# Patient Record
Sex: FEMALE | Race: WHITE | NOT HISPANIC OR LATINO | Employment: OTHER | URBAN - NONMETROPOLITAN AREA
[De-identification: names, ages, dates, MRNs, and addresses within clinical notes are randomized per-mention and may not be internally consistent; named-entity substitution may affect disease eponyms.]

---

## 2023-10-10 PROBLEM — M75.02 ADHESIVE CAPSULITIS OF LEFT SHOULDER: Status: ACTIVE | Noted: 2023-10-10

## 2023-10-10 PROBLEM — N60.19 FIBROCYSTIC DISEASE OF BREAST: Status: ACTIVE | Noted: 2023-10-10

## 2023-10-10 PROBLEM — R09.89 DIMINISHED PULSES IN LOWER EXTREMITY: Status: ACTIVE | Noted: 2023-10-10

## 2023-10-10 PROBLEM — K74.60 LIVER CIRRHOSIS SECONDARY TO NASH (MULTI): Status: ACTIVE | Noted: 2023-10-10

## 2023-10-10 PROBLEM — E66.811 CLASS 1 OBESITY WITH BODY MASS INDEX (BMI) OF 32.0 TO 32.9 IN ADULT: Status: ACTIVE | Noted: 2023-10-10

## 2023-10-10 PROBLEM — E66.9 CLASS 1 OBESITY WITH BODY MASS INDEX (BMI) OF 32.0 TO 32.9 IN ADULT: Status: ACTIVE | Noted: 2023-10-10

## 2023-10-10 PROBLEM — R60.9 EDEMA: Status: ACTIVE | Noted: 2023-10-10

## 2023-10-10 PROBLEM — I25.10 ATHEROSCLEROSIS OF NATIVE CORONARY ARTERY OF NATIVE HEART WITHOUT ANGINA PECTORIS: Status: ACTIVE | Noted: 2023-10-10

## 2023-10-10 PROBLEM — Z98.61 HISTORY OF PTCA: Status: ACTIVE | Noted: 2023-10-10

## 2023-10-10 PROBLEM — R79.89 ABNORMAL LIVER FUNCTION TEST: Status: ACTIVE | Noted: 2023-10-10

## 2023-10-10 PROBLEM — N63.0 BREAST LUMP: Status: ACTIVE | Noted: 2023-10-10

## 2023-10-10 PROBLEM — C50.119 CANCER OF CENTRAL PORTION OF FEMALE BREAST (MULTI): Status: ACTIVE | Noted: 2023-10-10

## 2023-10-10 PROBLEM — K74.60 CHRONIC LIVER DISEASE AND CIRRHOSIS (MULTI): Status: ACTIVE | Noted: 2023-10-10

## 2023-10-10 PROBLEM — K75.81 LIVER CIRRHOSIS SECONDARY TO NASH (MULTI): Status: ACTIVE | Noted: 2023-10-10

## 2023-10-10 PROBLEM — K76.9 CHRONIC LIVER DISEASE AND CIRRHOSIS (MULTI): Status: ACTIVE | Noted: 2023-10-10

## 2023-10-10 PROBLEM — I65.29 CAROTID ARTERY STENOSIS: Status: ACTIVE | Noted: 2023-10-10

## 2023-10-10 PROBLEM — R10.11 RIGHT UPPER QUADRANT ABDOMINAL PAIN: Status: ACTIVE | Noted: 2023-10-10

## 2023-10-10 PROBLEM — E78.5 HYPERLIPIDEMIA: Status: ACTIVE | Noted: 2023-10-10

## 2023-10-10 PROBLEM — E11.9 TYPE 2 DIABETES MELLITUS (MULTI): Status: ACTIVE | Noted: 2023-10-10

## 2023-10-10 PROBLEM — I73.9 PERIPHERAL VASCULAR DISEASE (CMS-HCC): Status: ACTIVE | Noted: 2023-10-10

## 2023-10-10 PROBLEM — I10 ESSENTIAL HYPERTENSION: Status: ACTIVE | Noted: 2023-10-10

## 2023-10-10 RX ORDER — NITROGLYCERIN 0.4 MG/1
0.4 TABLET SUBLINGUAL EVERY 5 MIN PRN
COMMUNITY
Start: 2021-11-03

## 2023-10-10 RX ORDER — IRBESARTAN 300 MG/1
1 TABLET ORAL DAILY
COMMUNITY
Start: 2021-05-29 | End: 2024-03-04 | Stop reason: SDUPTHER

## 2023-10-10 RX ORDER — ATORVASTATIN CALCIUM 20 MG/1
1 TABLET, FILM COATED ORAL NIGHTLY
COMMUNITY
Start: 2018-01-26 | End: 2024-02-20 | Stop reason: SDUPTHER

## 2023-10-10 RX ORDER — ASPIRIN 81 MG/1
1 TABLET ORAL DAILY
COMMUNITY
Start: 2022-04-07

## 2023-10-10 RX ORDER — AMLODIPINE BESYLATE 10 MG/1
1 TABLET ORAL DAILY
COMMUNITY
End: 2024-03-04 | Stop reason: SDUPTHER

## 2023-10-10 RX ORDER — HYDROCHLOROTHIAZIDE 12.5 MG/1
1 TABLET ORAL DAILY
COMMUNITY
Start: 2018-02-27 | End: 2024-05-23 | Stop reason: SDUPTHER

## 2023-10-10 RX ORDER — OXYBUTYNIN CHLORIDE 10 MG/1
10 TABLET, EXTENDED RELEASE ORAL DAILY
COMMUNITY
Start: 2016-04-19

## 2023-10-10 RX ORDER — CLONIDINE HYDROCHLORIDE 0.1 MG/1
0.5 TABLET ORAL 2 TIMES DAILY
COMMUNITY
Start: 2017-03-05 | End: 2024-04-04 | Stop reason: SDUPTHER

## 2023-10-10 RX ORDER — SERTRALINE HYDROCHLORIDE 50 MG/1
1 TABLET, FILM COATED ORAL DAILY
COMMUNITY
Start: 2021-10-28 | End: 2023-10-18

## 2023-10-10 RX ORDER — OMEPRAZOLE 20 MG/1
1 CAPSULE, DELAYED RELEASE ORAL DAILY
COMMUNITY

## 2023-10-10 RX ORDER — METOPROLOL TARTRATE 25 MG/1
0.5 TABLET, FILM COATED ORAL 2 TIMES DAILY
COMMUNITY
Start: 2021-02-08 | End: 2023-10-23 | Stop reason: SDUPTHER

## 2023-10-10 RX ORDER — NITROGLYCERIN 40 MG/1
PATCH TRANSDERMAL
COMMUNITY
Start: 2017-11-27

## 2023-10-11 ENCOUNTER — APPOINTMENT (OUTPATIENT)
Dept: CARDIOLOGY | Facility: CLINIC | Age: 83
End: 2023-10-11
Payer: COMMERCIAL

## 2023-10-11 ENCOUNTER — TRANSCRIBE ORDERS (OUTPATIENT)
Dept: CARDIOLOGY | Facility: CLINIC | Age: 83
End: 2023-10-11

## 2023-10-11 DIAGNOSIS — R55 NEAR SYNCOPE: ICD-10-CM

## 2023-10-18 ENCOUNTER — ANCILLARY PROCEDURE (OUTPATIENT)
Dept: CARDIOLOGY | Facility: CLINIC | Age: 83
End: 2023-10-18
Payer: COMMERCIAL

## 2023-10-18 ENCOUNTER — CLINICAL SUPPORT (OUTPATIENT)
Dept: CARDIOLOGY | Facility: CLINIC | Age: 83
End: 2023-10-18
Payer: COMMERCIAL

## 2023-10-18 VITALS
BODY MASS INDEX: 33.23 KG/M2 | DIASTOLIC BLOOD PRESSURE: 40 MMHG | HEIGHT: 61 IN | WEIGHT: 176 LBS | SYSTOLIC BLOOD PRESSURE: 110 MMHG | HEART RATE: 72 BPM

## 2023-10-18 DIAGNOSIS — R55 NEAR SYNCOPE: ICD-10-CM

## 2023-10-18 PROCEDURE — 99211 OFF/OP EST MAY X REQ PHY/QHP: CPT | Performed by: INTERNAL MEDICINE

## 2023-10-18 PROCEDURE — 93224 XTRNL ECG REC UP TO 48 HRS: CPT | Performed by: INTERNAL MEDICINE

## 2023-10-18 NOTE — PROGRESS NOTES
Patient here for BP check ordered by Dr. Donta Olivas DO due to hypotension. Dr. Ro Thompson MD in suite. Patient here due to at last office visit patient states she's been falling a lot and having near syncopal episodes. Medications updated with patient verbally. Patient is having no cardiac complaints at this time. Discussed with TARAS Gomez RN piror to discharge.     To Dr. Donta Olivas DO for review.

## 2023-10-20 ENCOUNTER — TELEPHONE (OUTPATIENT)
Dept: CARDIOLOGY | Facility: CLINIC | Age: 83
End: 2023-10-20
Payer: COMMERCIAL

## 2023-10-20 DIAGNOSIS — I10 ESSENTIAL HYPERTENSION: ICD-10-CM

## 2023-10-20 NOTE — TELEPHONE ENCOUNTER
Patient was int he office today to drop off her holter  monitor. She did bring her medications and states that she received an rx from [facility] for Toprol XL 50mg. She states taht she called them to find out why it was not for 25mg and that she ahs been falling / passing out. She said they couldn't answer why she had the wrong dose so she brought her rx's to office. Upon examination of the Metoprolol bottle she stated that she took the new Toprol 50mg and cut them all in half and mixed them with her old rx Metoprolol Tart 25mg. I was able to determine that she had a few of the tartrate 25mg half tablets and  them from the others.     TO Dr. Donta Olivas, DO to find out of patient can take Metoprolol Succ 50mg half tablet daily or if she should stay with the Metoprolol tart 25mg half tablet twice daily.     To Dr. Donta Olivas, DO

## 2023-10-20 NOTE — TELEPHONE ENCOUNTER
Don Pharmacist with [facility] phoned stating patient was prescribed Metoprolol tartrate 25 mg take 0.5 tablets bid, and metoprolol succinate 50 mg every day , they states patient has mixed these both together and has been taking them together. She is coming in to have Holter removed today in office. Phoned patient went to , left message to return call.

## 2023-10-23 NOTE — TELEPHONE ENCOUNTER
Phoned and spoke with patient. Advised of correct medication. Patient verbalized understanding. RX updated to pharmacy.

## 2023-10-24 RX ORDER — METOPROLOL TARTRATE 25 MG/1
12.5 TABLET, FILM COATED ORAL 2 TIMES DAILY
Qty: 90 TABLET | Refills: 3 | Status: SHIPPED | OUTPATIENT
Start: 2023-10-24 | End: 2024-10-23

## 2023-10-25 LAB — BODY SURFACE AREA: 1.85 M2

## 2023-10-26 ENCOUNTER — TELEPHONE (OUTPATIENT)
Dept: CARDIOLOGY | Facility: CLINIC | Age: 83
End: 2023-10-26
Payer: COMMERCIAL

## 2023-10-26 NOTE — TELEPHONE ENCOUNTER
Result Communication    Resulted Orders   Holter Or Event Cardiac Monitor   Result Value Ref Range    BSA 1.85 m2    Narrative    Patient underwent 48-hour Holter monitoring for history of syncope.  The   following observations are made:    1.  Rhythm is sinus with heart rates range between 50 and 109 bpm.  The   average heart rate was 75 bpm.  The longest pause was 1.4 seconds.    2.  There were 243 isolated PVCs.  There were no couplets or runs.    3.  There were 30 isolated PACs.  There were no couplets or runs.    4.  The patient reported feeling dizzy throughout the entire recording.    On 1 occasion the patient fell.  Rhythm at that time was sinus at a rate   of 79 bpm without ectopy or pauses.    Correlation is suggested.         2:34 PM      Results were successfully communicated with the patient and they acknowledged their understanding.

## 2023-10-26 NOTE — TELEPHONE ENCOUNTER
"----- Message from Donta Olivas DO sent at 10/26/2023 12:46 PM EDT -----  Holter monitor without any significant arrhythmias other than isolated PVCs and PACs with normal sinus rhythm throughout the 2-day recording.  None of these PVCs or PACs were associated with \"dizziness\"  "

## 2024-02-20 DIAGNOSIS — Z98.61 HISTORY OF PTCA: ICD-10-CM

## 2024-02-20 DIAGNOSIS — E78.5 HYPERLIPIDEMIA, UNSPECIFIED HYPERLIPIDEMIA TYPE: ICD-10-CM

## 2024-02-20 RX ORDER — ATORVASTATIN CALCIUM 20 MG/1
20 TABLET, FILM COATED ORAL NIGHTLY
Qty: 90 TABLET | Refills: 3 | Status: SHIPPED | OUTPATIENT
Start: 2024-02-20 | End: 2024-04-04 | Stop reason: DRUGHIGH

## 2024-03-04 DIAGNOSIS — I10 ESSENTIAL HYPERTENSION: ICD-10-CM

## 2024-03-05 RX ORDER — AMLODIPINE BESYLATE 10 MG/1
10 TABLET ORAL DAILY
Qty: 90 TABLET | Refills: 3 | Status: SHIPPED | OUTPATIENT
Start: 2024-03-05

## 2024-03-05 RX ORDER — IRBESARTAN 300 MG/1
300 TABLET ORAL DAILY
Qty: 90 TABLET | Refills: 3 | Status: SHIPPED | OUTPATIENT
Start: 2024-03-05

## 2024-04-04 ENCOUNTER — OFFICE VISIT (OUTPATIENT)
Dept: CARDIOLOGY | Facility: CLINIC | Age: 84
End: 2024-04-04
Payer: COMMERCIAL

## 2024-04-04 VITALS
HEART RATE: 64 BPM | DIASTOLIC BLOOD PRESSURE: 56 MMHG | SYSTOLIC BLOOD PRESSURE: 132 MMHG | HEIGHT: 61 IN | BODY MASS INDEX: 31.72 KG/M2 | WEIGHT: 168 LBS

## 2024-04-04 DIAGNOSIS — I25.10 ATHEROSCLEROSIS OF NATIVE CORONARY ARTERY OF NATIVE HEART WITHOUT ANGINA PECTORIS: ICD-10-CM

## 2024-04-04 DIAGNOSIS — Z98.61 HISTORY OF PTCA: ICD-10-CM

## 2024-04-04 DIAGNOSIS — Z78.9 NEVER SMOKED CIGARETTES: ICD-10-CM

## 2024-04-04 DIAGNOSIS — I73.9 PERIPHERAL VASCULAR DISEASE (CMS-HCC): ICD-10-CM

## 2024-04-04 DIAGNOSIS — I10 ESSENTIAL HYPERTENSION: ICD-10-CM

## 2024-04-04 PROCEDURE — 1036F TOBACCO NON-USER: CPT | Performed by: INTERNAL MEDICINE

## 2024-04-04 PROCEDURE — 3078F DIAST BP <80 MM HG: CPT | Performed by: INTERNAL MEDICINE

## 2024-04-04 PROCEDURE — 1160F RVW MEDS BY RX/DR IN RCRD: CPT | Performed by: INTERNAL MEDICINE

## 2024-04-04 PROCEDURE — 1159F MED LIST DOCD IN RCRD: CPT | Performed by: INTERNAL MEDICINE

## 2024-04-04 PROCEDURE — 99213 OFFICE O/P EST LOW 20 MIN: CPT | Performed by: INTERNAL MEDICINE

## 2024-04-04 PROCEDURE — 3075F SYST BP GE 130 - 139MM HG: CPT | Performed by: INTERNAL MEDICINE

## 2024-04-04 RX ORDER — CLONIDINE HYDROCHLORIDE 0.1 MG/1
0.05 TABLET ORAL 2 TIMES DAILY
Qty: 90 TABLET | Refills: 3 | Status: SHIPPED | OUTPATIENT
Start: 2024-04-04 | End: 2025-04-04

## 2024-04-04 RX ORDER — ATORVASTATIN CALCIUM 40 MG/1
40 TABLET, FILM COATED ORAL NIGHTLY
Qty: 90 TABLET | Refills: 3 | Status: SHIPPED | OUTPATIENT
Start: 2024-04-04 | End: 2025-04-04

## 2024-04-04 RX ORDER — DULOXETIN HYDROCHLORIDE 30 MG/1
30 CAPSULE, DELAYED RELEASE ORAL DAILY
COMMUNITY
Start: 2023-05-02

## 2024-04-04 NOTE — PROGRESS NOTES
"Lindsey Caraballo is a 83 y.o. female       Chief Complaint    Annual Exam          83-year-old female returns for annual follow-up she is doing well other than recent fall with subsequent right hip and right lower extremity numbness and tingling and difficulty with ambulation.  She does not use any assistive devices.  She denies angina or nitrate usage or hospitalizations and denies cardiovascular events    She has a history of multivessel PCI's as reviewed, dating back to 2009 through 2014, as well as right SFA angioplasty and stenting with myself and left femoral-popliteal bypass with Dr. Wright in the past. She has also had right carotid endarterectomy. She has no neurologic or thrombotic complaints or bleeding complaints at this time. Her blood pressure is much better controlled on clonidine in addition to her other medications    Recommendations: Discussed her right lower extremity numbness and tingling and painful with ambulation, consider pain management versus neuro evaluation in regards to this otherwise continue current medical therapies from a cardiac standpoint follow-up in 1 year         Review of Systems   All other systems reviewed and are negative.           Vitals:    04/04/24 1029   BP: 132/56   BP Location: Right arm   Patient Position: Sitting   Pulse: 64   Weight: 76.2 kg (168 lb)   Height: 1.549 m (5' 1\")        Objective   Physical Exam  Constitutional:       Appearance: Normal appearance.   HENT:      Nose: Nose normal.   Neck:      Vascular: No carotid bruit.   Cardiovascular:      Rate and Rhythm: Normal rate.      Pulses: Normal pulses.      Heart sounds: Normal heart sounds.   Pulmonary:      Effort: Pulmonary effort is normal.   Abdominal:      General: Bowel sounds are normal.      Palpations: Abdomen is soft.   Musculoskeletal:         General: Normal range of motion.      Cervical back: Normal range of motion.      Right lower leg: No edema.      Left lower leg: No edema. "   Skin:     General: Skin is warm and dry.   Neurological:      General: No focal deficit present.      Mental Status: She is alert.   Psychiatric:         Mood and Affect: Mood normal.         Behavior: Behavior normal.         Thought Content: Thought content normal.         Judgment: Judgment normal.         Allergies  Ace inhibitors, Amlodipine, Lisinopril, and Simvastatin     Current Medications    Current Outpatient Medications:     amLODIPine (Norvasc) 10 mg tablet, Take 1 tablet (10 mg) by mouth once daily., Disp: 90 tablet, Rfl: 3    aspirin 81 mg EC tablet, Take 1 tablet (81 mg) by mouth once daily., Disp: , Rfl:     atorvastatin (Lipitor) 20 mg tablet, Take 1 tablet (20 mg) by mouth once daily at bedtime., Disp: 90 tablet, Rfl: 3    cloNIDine (Catapres) 0.1 mg tablet, Take 0.5 tablets (0.05 mg) by mouth twice a day., Disp: , Rfl:     DULoxetine (Cymbalta) 30 mg DR capsule, Take 1 capsule (30 mg) by mouth once daily., Disp: , Rfl:     hydroCHLOROthiazide (HYDRODiuril) 12.5 mg tablet, Take 1 tablet (12.5 mg) by mouth once daily., Disp: , Rfl:     irbesartan (Avapro) 300 mg tablet, Take 1 tablet (300 mg) by mouth once daily., Disp: 90 tablet, Rfl: 3    metoprolol tartrate (Lopressor) 25 mg tablet, Take 0.5 tablets (12.5 mg) by mouth 2 times a day., Disp: 90 tablet, Rfl: 3    nitroglycerin (Nitrodur) 0.2 mg/hr patch, Place on the skin once daily. APPLY PATCH FOR 12 TO 14 HOURS DAILY, THEN REMOVE, Disp: , Rfl:     nitroglycerin (Nitrostat) 0.4 mg SL tablet, Place 1 tablet (0.4 mg) under the tongue every 5 minutes if needed for chest pain., Disp: , Rfl:     omeprazole (PriLOSEC) 20 mg DR capsule, Take 1 capsule (20 mg) by mouth once daily., Disp: , Rfl:     oxybutynin XL (Ditropan-XL) 10 mg 24 hr tablet, 1 tablet (10 mg) once daily., Disp: , Rfl:                      Assessment/Plan   1. Atherosclerosis of native coronary artery of native heart without angina pectoris        2. History of PTCA        3.  Peripheral vascular disease (CMS/HCC)        4. Essential hypertension        5. Never smoked cigarettes                 Scribe Attestation  By signing my name below, IBecki LPN, Scribe   attest that this documentation has been prepared under the direction and in the presence of Donta Olivas DO.     Provider Attestation - Scribe documentation    All medical record entries made by the Scribe were at my direction and personally dictated by me. I have reviewed the chart and agree that the record accurately reflects my personal performance of the history, physical exam, discussion and plan.

## 2024-04-04 NOTE — PATIENT INSTRUCTIONS
Please bring all medicines, vitamins, and herbal supplements with you when you come to the office.    Prescriptions will not be filled unless you are compliant with your follow up appointments or have a follow up appointment scheduled as per instruction of your physician. Refills should be requested at the time of your visit.     Fall Prevention Education Given     BMI was above normal measurement. Current weight: 76.2 kg (168 lb)  Weight change since last visit (-) denotes wt loss -8 lbs   Weight loss needed to achieve BMI 25: 36 Lbs  Weight loss needed to achieve BMI 30: 9.6 Lbs  Provided instructions on dietary changes  Provided instructions on exercise.

## 2024-04-04 NOTE — LETTER
"April 4, 2024     Chan Tamez DO  [address]  [address]  [facility] [address]    Patient: Tory Caraballo   YOB: 1940   Date of Visit: 4/4/2024       Dear Dr. Chan Tamez, :    Thank you for referring Tory Caraballo to me for evaluation. Below are my notes for this consultation.  If you have questions, please do not hesitate to call me. I look forward to following your patient along with you.       Sincerely,     Donta Olivas DO      CC: No Recipients  ______________________________________________________________________________________    Subjective   Tory Caraballo is a 83 y.o. female       Chief Complaint    Annual Exam          83-year-old female returns for annual follow-up she is doing well other than recent fall with subsequent right hip and right lower extremity numbness and tingling and difficulty with ambulation.  She does not use any assistive devices.  She denies angina or nitrate usage or hospitalizations and denies cardiovascular events    She has a history of multivessel PCI's as reviewed, dating back to 2009 through 2014, as well as right SFA angioplasty and stenting with myself and left femoral-popliteal bypass with Dr. Wright in the past. She has also had right carotid endarterectomy. She has no neurologic or thrombotic complaints or bleeding complaints at this time. Her blood pressure is much better controlled on clonidine in addition to her other medications    Recommendations: Discussed her right lower extremity numbness and tingling and painful with ambulation, consider pain management versus neuro evaluation in regards to this otherwise continue current medical therapies from a cardiac standpoint follow-up in 1 year         Review of Systems   All other systems reviewed and are negative.           Vitals:    04/04/24 1029   BP: 132/56   BP Location: Right arm   Patient Position: Sitting   Pulse: 64   Weight: 76.2 kg (168 lb)   Height: 1.549 m (5' 1\")    "     Objective   Physical Exam  Constitutional:       Appearance: Normal appearance.   HENT:      Nose: Nose normal.   Neck:      Vascular: No carotid bruit.   Cardiovascular:      Rate and Rhythm: Normal rate.      Pulses: Normal pulses.      Heart sounds: Normal heart sounds.   Pulmonary:      Effort: Pulmonary effort is normal.   Abdominal:      General: Bowel sounds are normal.      Palpations: Abdomen is soft.   Musculoskeletal:         General: Normal range of motion.      Cervical back: Normal range of motion.      Right lower leg: No edema.      Left lower leg: No edema.   Skin:     General: Skin is warm and dry.   Neurological:      General: No focal deficit present.      Mental Status: She is alert.   Psychiatric:         Mood and Affect: Mood normal.         Behavior: Behavior normal.         Thought Content: Thought content normal.         Judgment: Judgment normal.         Allergies  Ace inhibitors, Amlodipine, Lisinopril, and Simvastatin     Current Medications    Current Outpatient Medications:   •  amLODIPine (Norvasc) 10 mg tablet, Take 1 tablet (10 mg) by mouth once daily., Disp: 90 tablet, Rfl: 3  •  aspirin 81 mg EC tablet, Take 1 tablet (81 mg) by mouth once daily., Disp: , Rfl:   •  atorvastatin (Lipitor) 20 mg tablet, Take 1 tablet (20 mg) by mouth once daily at bedtime., Disp: 90 tablet, Rfl: 3  •  cloNIDine (Catapres) 0.1 mg tablet, Take 0.5 tablets (0.05 mg) by mouth twice a day., Disp: , Rfl:   •  DULoxetine (Cymbalta) 30 mg DR capsule, Take 1 capsule (30 mg) by mouth once daily., Disp: , Rfl:   •  hydroCHLOROthiazide (HYDRODiuril) 12.5 mg tablet, Take 1 tablet (12.5 mg) by mouth once daily., Disp: , Rfl:   •  irbesartan (Avapro) 300 mg tablet, Take 1 tablet (300 mg) by mouth once daily., Disp: 90 tablet, Rfl: 3  •  metoprolol tartrate (Lopressor) 25 mg tablet, Take 0.5 tablets (12.5 mg) by mouth 2 times a day., Disp: 90 tablet, Rfl: 3  •  nitroglycerin (Nitrodur) 0.2 mg/hr patch, Place on  the skin once daily. APPLY PATCH FOR 12 TO 14 HOURS DAILY, THEN REMOVE, Disp: , Rfl:   •  nitroglycerin (Nitrostat) 0.4 mg SL tablet, Place 1 tablet (0.4 mg) under the tongue every 5 minutes if needed for chest pain., Disp: , Rfl:   •  omeprazole (PriLOSEC) 20 mg DR capsule, Take 1 capsule (20 mg) by mouth once daily., Disp: , Rfl:   •  oxybutynin XL (Ditropan-XL) 10 mg 24 hr tablet, 1 tablet (10 mg) once daily., Disp: , Rfl:                      Assessment/Plan   1. Atherosclerosis of native coronary artery of native heart without angina pectoris        2. History of PTCA        3. Peripheral vascular disease (CMS/HCC)        4. Essential hypertension        5. Never smoked cigarettes                 Scribe Attestation  By signing my name below, Becki CLEARY LPN, Scribanika   attest that this documentation has been prepared under the direction and in the presence of Donta Olivas DO.     Provider Attestation - Scribe documentation    All medical record entries made by the Scribe were at my direction and personally dictated by me. I have reviewed the chart and agree that the record accurately reflects my personal performance of the history, physical exam, discussion and plan.

## 2024-05-23 DIAGNOSIS — I10 ESSENTIAL HYPERTENSION: ICD-10-CM

## 2024-05-29 RX ORDER — HYDROCHLOROTHIAZIDE 12.5 MG/1
12.5 TABLET ORAL DAILY
Qty: 90 TABLET | Refills: 3 | Status: SHIPPED | OUTPATIENT
Start: 2024-05-29 | End: 2025-05-29

## 2024-08-23 ENCOUNTER — TELEPHONE (OUTPATIENT)
Dept: CARDIOLOGY | Facility: CLINIC | Age: 84
End: 2024-08-23
Payer: COMMERCIAL

## 2024-08-23 NOTE — TELEPHONE ENCOUNTER
To whom it may concern,      Ms. Madsen is a 83-year-old female seeking preoperative risk assessment and clearance prior to lumbar surgery for symptomatic right lower extremity pseudoclaudication.  She has a history of left femoral popliteal bypass, coronary artery disease with multiple PCI's from 2090 through 2014 and asymptomatic from a cardiovascular standpoint.  She has a history of right carotid endarterectomy.    She remains at low to moderate risk for major adverse cardiac events between 2 to 5% given her history and age and multi vascular bed involvement; currently asymptomatic and able to complete 4 METS of activity daily.    She is clear from a cardiovascular standpoint     Sincerely,     Dr. GUICHO Olivas  [facility]  [address] [address]  Phone [phone]  Fax [phone]

## 2024-12-27 DIAGNOSIS — I10 ESSENTIAL HYPERTENSION: ICD-10-CM

## 2024-12-30 DIAGNOSIS — I10 ESSENTIAL HYPERTENSION: ICD-10-CM

## 2024-12-30 RX ORDER — AMLODIPINE BESYLATE 10 MG/1
10 TABLET ORAL DAILY
Qty: 90 TABLET | Refills: 3 | Status: SHIPPED | OUTPATIENT
Start: 2024-12-30

## 2024-12-30 RX ORDER — IRBESARTAN 300 MG/1
300 TABLET ORAL DAILY
Qty: 90 TABLET | Refills: 3 | Status: SHIPPED | OUTPATIENT
Start: 2024-12-30 | End: 2025-12-30

## 2025-01-04 DIAGNOSIS — I10 ESSENTIAL HYPERTENSION: ICD-10-CM

## 2025-01-07 RX ORDER — METOPROLOL TARTRATE 25 MG/1
12.5 TABLET, FILM COATED ORAL 2 TIMES DAILY
Qty: 90 TABLET | Refills: 3 | Status: SHIPPED | OUTPATIENT
Start: 2025-01-07 | End: 2026-01-07

## 2025-04-08 ENCOUNTER — APPOINTMENT (OUTPATIENT)
Dept: CARDIOLOGY | Facility: CLINIC | Age: 85
End: 2025-04-08
Payer: COMMERCIAL

## 2025-04-08 VITALS
BODY MASS INDEX: 27.94 KG/M2 | DIASTOLIC BLOOD PRESSURE: 82 MMHG | HEIGHT: 61 IN | HEART RATE: 52 BPM | WEIGHT: 148 LBS | SYSTOLIC BLOOD PRESSURE: 140 MMHG

## 2025-04-08 DIAGNOSIS — Z98.61 HISTORY OF PTCA: ICD-10-CM

## 2025-04-08 DIAGNOSIS — I25.10 ATHEROSCLEROSIS OF NATIVE CORONARY ARTERY OF NATIVE HEART WITHOUT ANGINA PECTORIS: ICD-10-CM

## 2025-04-08 DIAGNOSIS — E78.5 HYPERLIPIDEMIA, UNSPECIFIED HYPERLIPIDEMIA TYPE: ICD-10-CM

## 2025-04-08 DIAGNOSIS — I73.9 PERIPHERAL VASCULAR DISEASE (CMS-HCC): ICD-10-CM

## 2025-04-08 DIAGNOSIS — Z78.9 NEVER SMOKED CIGARETTES: ICD-10-CM

## 2025-04-08 DIAGNOSIS — I10 ESSENTIAL HYPERTENSION: ICD-10-CM

## 2025-04-08 PROBLEM — E66.811 CLASS 1 OBESITY WITH BODY MASS INDEX (BMI) OF 32.0 TO 32.9 IN ADULT: Status: RESOLVED | Noted: 2023-10-10 | Resolved: 2025-04-08

## 2025-04-08 PROCEDURE — 3077F SYST BP >= 140 MM HG: CPT | Performed by: INTERNAL MEDICINE

## 2025-04-08 PROCEDURE — 3079F DIAST BP 80-89 MM HG: CPT | Performed by: INTERNAL MEDICINE

## 2025-04-08 PROCEDURE — 1160F RVW MEDS BY RX/DR IN RCRD: CPT | Performed by: INTERNAL MEDICINE

## 2025-04-08 PROCEDURE — 99213 OFFICE O/P EST LOW 20 MIN: CPT | Performed by: INTERNAL MEDICINE

## 2025-04-08 PROCEDURE — 1159F MED LIST DOCD IN RCRD: CPT | Performed by: INTERNAL MEDICINE

## 2025-04-08 PROCEDURE — 1036F TOBACCO NON-USER: CPT | Performed by: INTERNAL MEDICINE

## 2025-04-08 RX ORDER — SERTRALINE HYDROCHLORIDE 25 MG/1
1 TABLET, FILM COATED ORAL
COMMUNITY
Start: 2025-03-15

## 2025-04-08 RX ORDER — ATORVASTATIN CALCIUM 20 MG/1
20 TABLET, FILM COATED ORAL DAILY
COMMUNITY

## 2025-04-08 NOTE — PATIENT INSTRUCTIONS
Please bring all medicines, vitamins, and herbal supplements with you when you come to the office.    Prescriptions will not be filled unless you are compliant with your follow up appointments or have a follow up appointment scheduled as per instruction of your physician. Refills should be requested at the time of your visit.     Fall Prevention Education Given     BMI was above normal measurement. Current weight: 67.1 kg (148 lb)  Weight change since last visit (-) denotes wt loss -20 lbs   Weight loss needed to achieve BMI 25: 16 Lbs  Weight loss needed to achieve BMI 30: -10.4 Lbs  Provided instructions on exercise.

## 2025-04-08 NOTE — PROGRESS NOTES
"Chief Complaint   Patient presents with    Follow-up     1y Follow up for Coronary Artery Disease        Subjective   Tory Caraballo is a 84 y.o. female     84-year-old female returns for annual follow-up from a cardiovascular standpoint for continued surveillance and cardiovascular maintenance.    She is doing well presently with no cardiovascular complaints, angina or nitrate usage, syncope.  She has had orthopedic procedures recently details discussed.  She had some issues with falling earlier this year we counseled her on following prevention.    She has a history of multivessel PCI's as reviewed, dating back to 2009 through 2014, as well as right SFA angioplasty and stenting with myself and left femoral-popliteal bypass with Dr. rWight in the past. She has also had right carotid endarterectomy.  She denies any claudication or neurologic lateralizing signs or deficits.    Most recent lipid panel reveals cholesterol 169 , triglycerides 138 HDL 54    Recommendations, continue current therapies, continue antiplatelet therapy, follow-up in 1 year         Review of Systems   All other systems reviewed and are negative.           Vitals:    04/08/25 1031   BP: 140/82   BP Location: Right arm   Patient Position: Sitting   Pulse: 52   Weight: 67.1 kg (148 lb)   Height: 1.549 m (5' 1\")        Objective   Physical Exam  Constitutional:       Appearance: Normal appearance.   HENT:      Nose: Nose normal.   Neck:      Vascular: No carotid bruit.   Cardiovascular:      Rate and Rhythm: Normal rate.      Pulses: Normal pulses.      Heart sounds: Normal heart sounds.   Pulmonary:      Effort: Pulmonary effort is normal.   Abdominal:      General: Bowel sounds are normal.      Palpations: Abdomen is soft.   Musculoskeletal:         General: Normal range of motion.      Cervical back: Normal range of motion.      Right lower leg: No edema.      Left lower leg: No edema.   Skin:     General: Skin is warm and dry. "   Neurological:      General: No focal deficit present.      Mental Status: She is alert.   Psychiatric:         Mood and Affect: Mood normal.         Behavior: Behavior normal.         Thought Content: Thought content normal.         Judgment: Judgment normal.         Allergies  Ace inhibitors, Amlodipine, Lisinopril, and Simvastatin     Current Medications    Current Outpatient Medications:     amLODIPine (Norvasc) 10 mg tablet, TAKE 1 TABLET ONCE DAILY, Disp: 90 tablet, Rfl: 3    aspirin 81 mg EC tablet, Take 1 tablet (81 mg) by mouth once daily., Disp: , Rfl:     atorvastatin (Lipitor) 20 mg tablet, Take 1 tablet (20 mg) by mouth once daily., Disp: , Rfl:     cloNIDine (Catapres) 0.1 mg tablet, Take 0.5 tablets (0.05 mg) by mouth 2 times a day., Disp: 90 tablet, Rfl: 3    DULoxetine (Cymbalta) 30 mg DR capsule, Take 1 capsule (30 mg) by mouth once daily., Disp: , Rfl:     hydroCHLOROthiazide (Microzide) 12.5 mg tablet, Take 1 tablet (12.5 mg) by mouth once daily., Disp: 90 tablet, Rfl: 3    irbesartan (Avapro) 300 mg tablet, Take 1 tablet (300 mg) by mouth once daily., Disp: 90 tablet, Rfl: 3    metoprolol tartrate (Lopressor) 25 mg tablet, Take 0.5 tablets (12.5 mg) by mouth 2 times a day., Disp: 90 tablet, Rfl: 3    nitroglycerin (Nitrodur) 0.2 mg/hr patch, Place on the skin once daily. APPLY PATCH FOR 12 TO 14 HOURS DAILY, THEN REMOVE, Disp: , Rfl:     nitroglycerin (Nitrostat) 0.4 mg SL tablet, Place 1 tablet (0.4 mg) under the tongue every 5 minutes if needed for chest pain., Disp: , Rfl:     omeprazole (PriLOSEC) 20 mg DR capsule, Take 1 capsule (20 mg) by mouth once daily., Disp: , Rfl:     oxybutynin XL (Ditropan-XL) 10 mg 24 hr tablet, 1 tablet (10 mg) once daily., Disp: , Rfl:     sertraline (Zoloft) 25 mg tablet, Take 1 tablet (25 mg) by mouth early in the morning.., Disp: , Rfl:                      Assessment/Plan   1. Atherosclerosis of native coronary artery of native heart without angina pectoris   Follow Up In Cardiology      2. History of PTCA        3. Essential hypertension        4. Hyperlipidemia, unspecified hyperlipidemia type        5. BMI 27.0-27.9,adult        6. Never smoked cigarettes                 Scribe Attestation  By signing my name below, ITracy LPN, Scribe   attest that this documentation has been prepared under the direction and in the presence of Donta Olivas DO.     Provider Attestation - Scribe documentation    All medical record entries made by the Scribe were at my direction and personally dictated by me. I have reviewed the chart and agree that the record accurately reflects my personal performance of the history, physical exam, discussion and plan.

## 2025-04-08 NOTE — LETTER
"April 8, 2025     Chan Tamez DO  [address]  [address]  [facility] [address]    Patient: Tory Caraballo   YOB: 1940   Date of Visit: 4/8/2025       Dear Dr. Chan Tamez, :    Thank you for referring Tory Caraballo to me for evaluation. Below are my notes for this consultation.  If you have questions, please do not hesitate to call me. I look forward to following your patient along with you.       Sincerely,     Donta Olivas DO      CC: No Recipients  ______________________________________________________________________________________    Chief Complaint   Patient presents with   • Follow-up     1y Follow up for Coronary Artery Disease        Subjective   Tory Caraballo is a 84 y.o. female     84-year-old female returns for annual follow-up from a cardiovascular standpoint for continued surveillance and cardiovascular maintenance.    She is doing well presently with no cardiovascular complaints, angina or nitrate usage, syncope.  She has had orthopedic procedures recently details discussed.  She had some issues with falling earlier this year we counseled her on following prevention.    She has a history of multivessel PCI's as reviewed, dating back to 2009 through 2014, as well as right SFA angioplasty and stenting with myself and left femoral-popliteal bypass with Dr. Wright in the past. She has also had right carotid endarterectomy.  She denies any claudication or neurologic lateralizing signs or deficits.    Most recent lipid panel reveals cholesterol 169 , triglycerides 138 HDL 54    Recommendations, continue current therapies, continue antiplatelet therapy, follow-up in 1 year         Review of Systems   All other systems reviewed and are negative.           Vitals:    04/08/25 1031   BP: 140/82   BP Location: Right arm   Patient Position: Sitting   Pulse: 52   Weight: 67.1 kg (148 lb)   Height: 1.549 m (5' 1\")        Objective   Physical Exam  Constitutional:       " Appearance: Normal appearance.   HENT:      Nose: Nose normal.   Neck:      Vascular: No carotid bruit.   Cardiovascular:      Rate and Rhythm: Normal rate.      Pulses: Normal pulses.      Heart sounds: Normal heart sounds.   Pulmonary:      Effort: Pulmonary effort is normal.   Abdominal:      General: Bowel sounds are normal.      Palpations: Abdomen is soft.   Musculoskeletal:         General: Normal range of motion.      Cervical back: Normal range of motion.      Right lower leg: No edema.      Left lower leg: No edema.   Skin:     General: Skin is warm and dry.   Neurological:      General: No focal deficit present.      Mental Status: She is alert.   Psychiatric:         Mood and Affect: Mood normal.         Behavior: Behavior normal.         Thought Content: Thought content normal.         Judgment: Judgment normal.         Allergies  Ace inhibitors, Amlodipine, Lisinopril, and Simvastatin     Current Medications    Current Outpatient Medications:   •  amLODIPine (Norvasc) 10 mg tablet, TAKE 1 TABLET ONCE DAILY, Disp: 90 tablet, Rfl: 3  •  aspirin 81 mg EC tablet, Take 1 tablet (81 mg) by mouth once daily., Disp: , Rfl:   •  atorvastatin (Lipitor) 20 mg tablet, Take 1 tablet (20 mg) by mouth once daily., Disp: , Rfl:   •  cloNIDine (Catapres) 0.1 mg tablet, Take 0.5 tablets (0.05 mg) by mouth 2 times a day., Disp: 90 tablet, Rfl: 3  •  DULoxetine (Cymbalta) 30 mg DR capsule, Take 1 capsule (30 mg) by mouth once daily., Disp: , Rfl:   •  hydroCHLOROthiazide (Microzide) 12.5 mg tablet, Take 1 tablet (12.5 mg) by mouth once daily., Disp: 90 tablet, Rfl: 3  •  irbesartan (Avapro) 300 mg tablet, Take 1 tablet (300 mg) by mouth once daily., Disp: 90 tablet, Rfl: 3  •  metoprolol tartrate (Lopressor) 25 mg tablet, Take 0.5 tablets (12.5 mg) by mouth 2 times a day., Disp: 90 tablet, Rfl: 3  •  nitroglycerin (Nitrodur) 0.2 mg/hr patch, Place on the skin once daily. APPLY PATCH FOR 12 TO 14 HOURS DAILY, THEN REMOVE,  Disp: , Rfl:   •  nitroglycerin (Nitrostat) 0.4 mg SL tablet, Place 1 tablet (0.4 mg) under the tongue every 5 minutes if needed for chest pain., Disp: , Rfl:   •  omeprazole (PriLOSEC) 20 mg DR capsule, Take 1 capsule (20 mg) by mouth once daily., Disp: , Rfl:   •  oxybutynin XL (Ditropan-XL) 10 mg 24 hr tablet, 1 tablet (10 mg) once daily., Disp: , Rfl:   •  sertraline (Zoloft) 25 mg tablet, Take 1 tablet (25 mg) by mouth early in the morning.., Disp: , Rfl:                      Assessment/Plan   1. Atherosclerosis of native coronary artery of native heart without angina pectoris  Follow Up In Cardiology      2. History of PTCA        3. Essential hypertension        4. Hyperlipidemia, unspecified hyperlipidemia type        5. BMI 27.0-27.9,adult        6. Never smoked cigarettes                 Scribe Attestation  By signing my name below, ITracy LPN, Scribe   attest that this documentation has been prepared under the direction and in the presence of Donta Olivas DO.     Provider Attestation - Scribe documentation    All medical record entries made by the Scribe were at my direction and personally dictated by me. I have reviewed the chart and agree that the record accurately reflects my personal performance of the history, physical exam, discussion and plan.

## 2025-08-14 DIAGNOSIS — E78.2 MIXED HYPERLIPIDEMIA: ICD-10-CM

## 2025-08-15 RX ORDER — ATORVASTATIN CALCIUM 20 MG/1
20 TABLET, FILM COATED ORAL DAILY
Qty: 90 TABLET | Refills: 3 | Status: SHIPPED | OUTPATIENT
Start: 2025-08-15 | End: 2026-08-15

## 2025-08-20 DIAGNOSIS — E78.2 MIXED HYPERLIPIDEMIA: ICD-10-CM

## 2025-08-20 RX ORDER — ATORVASTATIN CALCIUM 20 MG/1
20 TABLET, FILM COATED ORAL DAILY
Qty: 90 TABLET | Refills: 3 | Status: SHIPPED | OUTPATIENT
Start: 2025-08-20 | End: 2026-08-20

## 2026-04-08 ENCOUNTER — APPOINTMENT (OUTPATIENT)
Dept: CARDIOLOGY | Facility: CLINIC | Age: 86
End: 2026-04-08
Payer: COMMERCIAL